# Patient Record
Sex: FEMALE | Employment: PART TIME | URBAN - NONMETROPOLITAN AREA
[De-identification: names, ages, dates, MRNs, and addresses within clinical notes are randomized per-mention and may not be internally consistent; named-entity substitution may affect disease eponyms.]

---

## 2017-07-13 ENCOUNTER — APPOINTMENT (OUTPATIENT)
Dept: OCCUPATIONAL MEDICINE | Facility: OTHER | Age: 42
End: 2017-07-13

## 2017-07-13 PROCEDURE — 99000 SPECIMEN HANDLING OFFICE-LAB: CPT

## 2020-03-30 ENCOUNTER — VIRTUAL VISIT (OUTPATIENT)
Dept: FAMILY MEDICINE | Facility: OTHER | Age: 45
End: 2020-03-30

## 2020-03-31 NOTE — PROGRESS NOTES
"Date: 2020 21:47:20  Clinician: Kiesha Montano  Clinician NPI: [id number]  Patient: Shannon Zambrano  Patient : 1975  Patient Address: [address] [place] [address]  Patient Phone: [phone]  Visit Protocol: URI  Patient Summary:  Shannon is a 45 year old ( : 1975 ) female who initiated a Visit for COVID-19 (Coronavirus) evaluation and screening. When asked the question \"Please sign me up to receive news, health information and promotions. \", Shannon responded \"Yes\".    Shannon states her symptoms started suddenly 10-13 days ago. After her symptoms started, they improved and then got worse again.   Her symptoms consist of rhinitis, a headache, myalgia, a sore throat, a cough, nasal congestion, and malaise. She is experiencing difficulty breathing due to nasal congestion but she is not short of breath. Shannon also feels feverish.   Symptom details     Nasal secretions: The color of her mucus is yellow, white, green, and clear.    Cough: Shannon coughs every 5-10 minutes and her cough is more bothersome at night. Phlegm comes into her throat when she coughs. She does not believe her cough is caused by post-nasal drip. The color of the phlegm is yellow, clear, white, and green.     Sore throat: Shannon reports having moderate throat pain (4-6 on a 10 point pain scale), does not have exudate on her tonsils, and can swallow liquids. The lymph nodes in her neck are not enlarged. A rash has not appeared on the skin since the sore throat started.     Temperature: Her current temperature is 96.5 degrees Fahrenheit.     Headache: She states the headache is moderate (4-6 on a 10 point pain scale).      Shannon denies having teeth pain, facial pain or pressure, chills, wheezing, enlarged lymph nodes, and ear pain. She also denies having a sinus infection within the past year, taking antibiotic medication for the symptoms, and having recent facial or sinus surgery in the past 60 days.   " Precipitating events  Within the past week, Shannon has been exposed to someone with strep throat. She has recently been exposed to someone with influenza. Shannon has been in close contact with the following high risk individuals: immunocompromised people.   Pertinent COVID-19 (Coronavirus) information  Shannon has not traveled internationally or to the areas where COVID-19 (Coronavirus) is widespread, including cruise ship travel in the last 14 days before the start of her symptoms.   Shannon has not had a close contact with a laboratory-confirmed COVID-19 patient within 14 days of symptom onset. She also has not had a close contact with a suspected COVID-19 patient within 14 days of symptom onset.   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 She provides direct patient care. She does not live with a healthcare worker.   Pertinent medical history  Shannon does not get yeast infections when she takes antibiotics.   Shannon needs a return to work/school note.   Weight: 175 lbs   Shannon does not smoke or use smokeless tobacco.   She denies pregnancy and denies breastfeeding. She does not menstruate.   Weight: 175 lbs    MEDICATIONS: No current medications, ALLERGIES: NKDA  Clinician Response:  Dear Shannon,  Based on the information provided, you have viral pharyngitis. This is a sore throat caused by a virus and is usually the first sign of a cold. Your sore throat should resolve in a couple days as other cold symptoms develop.  Unfortunately, there are no medications that can cure a cold, so treatment is focused on controlling symptoms as much as possible until you recover. Most people gradually feel better in 1-2 weeks.  Medication information  Because you have a viral infection, antibiotics will not help you get better. Treating a viral infection with antibiotics could actually make you feel worse.  I am prescribing:     Fluticasone 50 mcg/actuation nasal spray.  Inhale 2 sprays in each nostril 1 time per day; after 1 week, may adjust to 1 - 2 sprays in each nostril 1 time per day. This medication takes several days to start working, so keep taking it even if it doesn't help right away. There are no refills with this prescription.   Self care  Steps you can take to be as comfortable as possible:     Rest.    Drink plenty of fluids.    Take a warm shower to loosen congestion    Use a cool-mist humidifier.    Use throat lozenges.    Suck on frozen items such as popsicles.    Drink hot tea with lemon and honey.    Gargle with warm salt water (1/4 teaspoon of salt per 8 ounce glass of water).    Take a spoonful of honey to reduce your cough.     When to seek care  Please be seen in a clinic or urgent care if new symptoms develop, or symptoms become worse.  Call 911 or go to the emergency room if you feel that your throat is closing off, you suddenly develop a rash, you are unable to swallow fluids, you are drooling, or you are having difficulty breathing.  Additional treatment plan   Based on the information you have provided, you do have symptoms that are consistent with Coronavirus (COVID-19).   The coronavirus causes mild to severe respiratory illness with the most common symptoms including fever, cough and difficulty breathing. Unfortunately, many viruses cause similar symptoms and it can be difficult to distinguish between viruses, especially in mild cases, so we are presuming that anyone with cough or fever has coronavirus at this time.  Coronavirus/COVID-19 has reached the point of community spread in [place], meaning that we are finding the virus in people with no known exposure risk for randy the virus. Given the increasing commonness of coronavirus in the community we are no longer testing patients who are not critically ill.  If you are a health care worker, you should refer to your employee health office for instructions about testing and returning to work.   For everyone else who has cough or fever, you should assume you are infected with coronavirus. Since you will not be tested but have symptoms that may be consistent with coronavirus, the CDC recommends you stay in self-isolation until these three things have happened:    You have had no fever for at least 72 hours (that is three full days of no fever without the use of medicine that reduces fevers)    AND   Other symptoms have improved (for example, when your cough or shortness of breath have improved)   AND   At least 7 days have passed since your symptoms first appeared.   How to Isolate:    Isolate yourself at home.   Do Not allow any visitors  Do Not go to work or school  Do Not go to [employer],  centers, shopping, or other public places.  Do Not shake hands.  Avoid close contact with others (hugging, kissing).   Protect Others:    Cover Your Mouth and Nose with a mask, disposable tissue or wash cloth to avoid spreading germs to others.  Wash your hands and face frequently with soap and water.   Managing Symptoms:    At this time, we primarily recommend Tylenol (Acetaminophen) for fever or pain. If you have liver or kidney problems, contact your primary care provider for instructions on use of tylenol. Adults can take 650 mg (two 325 mg pills) by mouth every 4-6 hours as needed OR 1,000 mg (two 500 mg pills) every 8 hours as needed. MAXIMUM DAILY DOSE: 3,000mg. For children, refer to dosing on bottle based on age or weight.   If you develop significant shortness of breath that prevents you from doing normal activities, please call 911 or proceed to the nearest emergency room and alert them immediately that you have been in self-isolation for possible coronavirus.   If you have a higher risk medical condition such as cancer, heart failure, end stage renal disease on dialysis or have a transplant, please reach out to your specialist's clinic to advise them of your [facility] visit should you not improve within  the next two days.  For more information about COVID19 and options for caring for yourself at home, please visit the CDC website at https://www.cdc.gov/coronavirus/2019-ncov/about/steps-when-sick.htmlFor more options for care at [facility], please visit our website at [email or web address]     Diagnosis: Cough  Diagnosis ICD: R05  Prescription: fluticasone 50 mcg/actuation nasal spray,suspension 1 120 spray aerosol with adapter (grams), 30 days supply. Inhale 2 sprays in each nostril 1 time per day; after 1 week, may adjust to 1 - 2 sprays in each nostril 1 time per day.. Refills: 0, Refill as needed: no, Allow substitutions: yes

## 2020-08-04 ENCOUNTER — TELEPHONE (OUTPATIENT)
Dept: FAMILY MEDICINE | Facility: OTHER | Age: 45
End: 2020-08-04

## 2020-08-04 NOTE — TELEPHONE ENCOUNTER
Patient calling and requesting to be tested for covid. States an employee had tested positive. Patient is asymptomatic and has not had close contact, less than 6 feet for greater than 15 minutes, with the employee who tested positive. Patient does not meet criteria to be tested and was advised to call back if symptoms develop. Patient verbalized understanding.

## 2020-08-06 ENCOUNTER — TELEPHONE (OUTPATIENT)
Dept: FAMILY MEDICINE | Facility: OTHER | Age: 45
End: 2020-08-06

## 2020-08-06 ENCOUNTER — OFFICE VISIT (OUTPATIENT)
Dept: FAMILY MEDICINE | Facility: OTHER | Age: 45
End: 2020-08-06
Attending: FAMILY MEDICINE
Payer: OTHER GOVERNMENT

## 2020-08-06 DIAGNOSIS — R05.9 COUGH: Primary | ICD-10-CM

## 2020-08-06 PROCEDURE — U0003 INFECTIOUS AGENT DETECTION BY NUCLEIC ACID (DNA OR RNA); SEVERE ACUTE RESPIRATORY SYNDROME CORONAVIRUS 2 (SARS-COV-2) (CORONAVIRUS DISEASE [COVID-19]), AMPLIFIED PROBE TECHNIQUE, MAKING USE OF HIGH THROUGHPUT TECHNOLOGIES AS DESCRIBED BY CMS-2020-01-R: HCPCS | Performed by: FAMILY MEDICINE

## 2020-08-06 NOTE — LETTER
To Whom It May Concern:      The [place] Department of Health ([facility]) has requested that employers not require health care provider notes to validate illness or return to work due to current demands on medical facilities. You can provide your employer with this letter or encourage him/her to access this information on the [facility] s website at: [email or web address].     In accordance with [facility] guidelines, we are not providing individual work excuses for patients with respiratory symptoms.      The Centers for Disease Control and Prevention (CDC) has also issued guidelines for individuals that come in contact with or test positive for COVID-19. These guidelines can also be accessed at the CDC s website: https://www.cdc.gov/coronavirus/2019-ncov/if-you-are-sick/quarantine-isolation.html.    If you live in a community where COVID-19 is or might be spreading (currently, that is virtually everywhere in the [place])  1. Watch Your Health: Be alert for symptoms. Watch for fever, cough, shortness of breath, or other symptoms of COVID-19.  2. Take your temperature if symptoms develop.  3. Practice social distancing. Maintain 6 feet of distance from others, and stay out of crowded places.  4. Follow CDC guidance if symptoms develop.    If you feel healthy but recently had close contact with a person with COVID-19: Stay home and monitor your health (Quarantine)  1. The CDC recommends you monitor yourself for 14 days after your last exposure for symptoms of Covid-19.  Please follow your Employer s guidelines for return to work.  2. Check your temperature twice a day.  3. If possible, stay away from people who are at higher-risk for getting very sick from COVID-19.    If you have been diagnosed with COVID-19, are waiting test results, have cough, fever, shortness of breath, or other symptoms of COVID-19:  1. Isolate yourself from others.  2. Stay home, contact your Employer and  follow their recommended guidelines.  3. If you live with others, stay in a specific  sick room  or area and away from other people or animals, including pets. Use a separate bathroom, if available.    For general questions or for symptomatic patients that need to be screened for testing: please call   [facility] Nurse Triage at [phone].     Sincerely,   Miles Cerda

## 2020-08-06 NOTE — TELEPHONE ENCOUNTER
Pt called, requesting covid testing. Reports cough, runny nose that started yesterday. Denies chest pain, fever. Scheduled for curbside testing. Advised 14 day quarantine, symptomatic treatment, call back with change or worsening in symptoms. Pt verbalizes understanding.

## 2020-08-08 LAB
SARS-COV-2 RNA SPEC QL NAA+PROBE: NOT DETECTED
SPECIMEN SOURCE: NORMAL

## 2020-08-11 ENCOUNTER — NURSE TRIAGE (OUTPATIENT)
Dept: NURSING | Facility: OTHER | Age: 45
End: 2020-08-11

## 2020-08-11 NOTE — TELEPHONE ENCOUNTER
Pt called back, states that her employer wants to return to work even though her symptoms have not improved. Pt was advised that employer should be following guidelines from [place]/CDC but if they choose not to, that is their decision. Pt was advised to speak with her employer regarding this.

## 2020-08-11 NOTE — TELEPHONE ENCOUNTER
Pt advised to call back with any concerns or changes reports mild cough as only symptoms just requesting writer review care advice.     Reason for Disposition    COVID-19 Home Isolation, questions about    Additional Information    Negative: SEVERE difficulty breathing (e.g., struggling for each breath, speaks in single words)    Negative: Difficult to awaken or acting confused (e.g., disoriented, slurred speech)    Negative: Bluish (or gray) lips or face now    Negative: Shock suspected (e.g., cold/pale/clammy skin, too weak to stand, low BP, rapid pulse)    Negative: Sounds like a life-threatening emergency to the triager    Negative: [1] COVID-19 exposure AND [2] no symptoms    Negative: COVID-19 and Breastfeeding, questions about    Negative: [1] Adult with possible COVID-19 symptoms AND [2] triager concerned about severity of symptoms or other causes    Negative: SEVERE or constant chest pain or pressure (Exception: mild central chest pain, present only when coughing)    Negative: MODERATE difficulty breathing (e.g., speaks in phrases, SOB even at rest, pulse 100-120)    Negative: Patient sounds very sick or weak to the triager    Negative: MILD difficulty breathing (e.g., minimal/no SOB at rest, SOB with walking, pulse <100)    Negative: Chest pain or pressure    Negative: Fever > 103 F (39.4 C)    Negative: [1] Fever > 101 F (38.3 C) AND [2] age > 60    Negative: [1] Fever > 100.0 F (37.8 C) AND [2] bedridden (e.g., nursing home patient, CVA, chronic illness, recovering from surgery)    Negative: HIGH RISK patient (e.g., age > 64 years, diabetes, heart or lung disease, weak immune system)    Negative: Fever present > 3 days (72 hours)    Negative: [1] Fever returns after gone for over 24 hours AND [2] symptoms worse or not improved    Negative: [1] Continuous (nonstop) coughing interferes with work or school AND [2] no improvement using cough treatment per protocol    Negative: [1] COVID-19 infection  "suspected by caller or triager AND [2] mild symptoms (cough, fever, or others) AND [3] no complications or SOB    Negative: Cough present > 3 weeks    Negative: [1] COVID-19 diagnosed by positive lab test AND [2] mild symptoms (e.g., cough, fever, others) AND [3] no complications or SOB    Negative: [1] COVID-19 diagnosed by HCP (doctor, NP or PA) AND [2] mild symptoms (e.g., cough, fever, others) AND [3] no complications or SOB    Answer Assessment - Initial Assessment Questions  1. COVID-19 DIAGNOSIS: \"Who made your Coronavirus (COVID-19) diagnosis?\" \"Was it confirmed by a positive lab test?\" If not diagnosed by a HCP, ask \"Are there lots of cases (community spread) where you live?\" (See public health department website, if unsure)      Tested negative  2. ONSET: \"When did the COVID-19 symptoms start?\"       Last week  3. WORST SYMPTOM: \"What is your worst symptom?\" (e.g., cough, fever, shortness of breath, muscle aches)      Cough dry  4. COUGH: \"Do you have a cough?\" If so, ask: \"How bad is the cough?\"        yes  5. FEVER: \"Do you have a fever?\" If so, ask: \"What is your temperature, how was it measured, and when did it start?\"      denies  6. RESPIRATORY STATUS: \"Describe your breathing?\" (e.g., shortness of breath, wheezing, unable to speak)       denies  7. BETTER-SAME-WORSE: \"Are you getting better, staying the same or getting worse compared to yesterday?\"  If getting worse, ask, \"In what way?\"      same  8. HIGH RISK DISEASE: \"Do you have any chronic medical problems?\" (e.g., asthma, heart or lung disease, weak immune system, etc.)      denies  9. PREGNANCY: \"Is there any chance you are pregnant?\" \"When was your last menstrual period?\"      no  10. OTHER SYMPTOMS: \"Do you have any other symptoms?\"  (e.g., chills, fatigue, headache, loss of smell or taste, muscle pain, sore throat)        no    Protocols used: CORONAVIRUS (COVID-19) DIAGNOSED OR [id number] 5.16.20      "

## 2021-08-07 ENCOUNTER — HOSPITAL ENCOUNTER (EMERGENCY)
Facility: HOSPITAL | Age: 46
Discharge: HOME | End: 2021-08-07
Attending: STUDENT IN AN ORGANIZED HEALTH CARE EDUCATION/TRAINING PROGRAM | Admitting: STUDENT IN AN ORGANIZED HEALTH CARE EDUCATION/TRAINING PROGRAM
Payer: COMMERCIAL

## 2021-08-07 ENCOUNTER — APPOINTMENT (OUTPATIENT)
Dept: GENERAL RADIOLOGY | Facility: HOSPITAL | Age: 46
End: 2021-08-07
Attending: STUDENT IN AN ORGANIZED HEALTH CARE EDUCATION/TRAINING PROGRAM
Payer: COMMERCIAL

## 2021-08-07 VITALS
SYSTOLIC BLOOD PRESSURE: 138 MMHG | OXYGEN SATURATION: 96 % | HEART RATE: 65 BPM | RESPIRATION RATE: 14 BRPM | DIASTOLIC BLOOD PRESSURE: 90 MMHG | TEMPERATURE: 97.2 F

## 2021-08-07 DIAGNOSIS — M25.511 ACUTE PAIN OF RIGHT SHOULDER: ICD-10-CM

## 2021-08-07 DIAGNOSIS — M25.562 ACUTE PAIN OF LEFT KNEE: ICD-10-CM

## 2021-08-07 DIAGNOSIS — M25.572 ACUTE LEFT ANKLE PAIN: ICD-10-CM
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
## 2021-08-07 DIAGNOSIS — R20.2 TINGLING: ICD-10-CM

## 2021-08-07 PROCEDURE — 73562 X-RAY EXAM OF KNEE 3: CPT | Mod: LT

## 2021-08-07 PROCEDURE — 96372 THER/PROPH/DIAG INJ SC/IM: CPT | Performed by: STUDENT IN AN ORGANIZED HEALTH CARE EDUCATION/TRAINING PROGRAM

## 2021-08-07 PROCEDURE — 250N000011 HC RX IP 250 OP 636: Performed by: STUDENT IN AN ORGANIZED HEALTH CARE EDUCATION/TRAINING PROGRAM

## 2021-08-07 PROCEDURE — 73030 X-RAY EXAM OF SHOULDER: CPT | Mod: RT

## 2021-08-07 PROCEDURE — 99284 EMERGENCY DEPT VISIT MOD MDM: CPT | Mod: 25

## 2021-08-07 PROCEDURE — 73610 X-RAY EXAM OF ANKLE: CPT | Mod: LT

## 2021-08-07 PROCEDURE — 99284 EMERGENCY DEPT VISIT MOD MDM: CPT | Performed by: STUDENT IN AN ORGANIZED HEALTH CARE EDUCATION/TRAINING PROGRAM

## 2021-08-07 RX ORDER — KETOROLAC TROMETHAMINE 15 MG/ML
15 INJECTION, SOLUTION INTRAMUSCULAR; INTRAVENOUS ONCE
Status: COMPLETED | OUTPATIENT
Start: 2021-08-07 | End: 2021-08-07

## 2021-08-07 RX ADMIN — KETOROLAC TROMETHAMINE 15 MG: 15 INJECTION, SOLUTION INTRAMUSCULAR; INTRAVENOUS at 14:03

## 2021-08-07 ASSESSMENT — ENCOUNTER SYMPTOMS
BACK PAIN: 0
FACIAL ASYMMETRY: 0
NECK STIFFNESS: 0
PSYCHIATRIC NEGATIVE: 1
CONSTITUTIONAL NEGATIVE: 1
HEADACHES: 0
NECK PAIN: 0
CARDIOVASCULAR NEGATIVE: 1
RESPIRATORY NEGATIVE: 1
SEIZURES: 0
GASTROINTESTINAL NEGATIVE: 1
EYES NEGATIVE: 1
JOINT SWELLING: 0
ENDOCRINE NEGATIVE: 1
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
## 2021-08-07 NOTE — DISCHARGE INSTRUCTIONS
- Please take Tylenol and Ibuprofen for your symptoms  - Please return to the Emergency Room if you do not improve, feel worse, or have any new or concerning symptoms. We would especially want to see you back if you experience loss of strength/sensation in an arm or leg, worsening headache, or have a seizure.  - Please follow up with a primary care physician in 3-4 days if symptoms are not improving.
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
## 2021-08-07 NOTE — Clinical Note
Shannon Zambrano was seen and treated in our emergency department on 8/7/2021.  She may return to work on 08/10/2021.       If you have any questions or concerns, please don't hesitate to call.      Sukumar Donis MD

## 2022-02-12 ENCOUNTER — LAB (OUTPATIENT)
Dept: FAMILY MEDICINE | Facility: OTHER | Age: 47
End: 2022-02-12
Attending: FAMILY MEDICINE
Payer: OTHER GOVERNMENT

## 2022-02-12 DIAGNOSIS — Z20.822 SUSPECTED COVID-19 VIRUS INFECTION: ICD-10-CM

## 2022-02-12 PROCEDURE — U0005 INFEC AGEN DETEC AMPLI PROBE: HCPCS

## 2022-02-12 PROCEDURE — U0003 INFECTIOUS AGENT DETECTION BY NUCLEIC ACID (DNA OR RNA); SEVERE ACUTE RESPIRATORY SYNDROME CORONAVIRUS 2 (SARS-COV-2) (CORONAVIRUS DISEASE [COVID-19]), AMPLIFIED PROBE TECHNIQUE, MAKING USE OF HIGH THROUGHPUT TECHNOLOGIES AS DESCRIBED BY CMS-2020-01-R: HCPCS

## 2022-02-12 NOTE — LETTER
To whom it may concern,     Shannon Zambrano THIAGO 8- was seen today , 22 for Covid 19 testing. Results pending           Thank you for your time in this matter.   [facility].

## 2022-02-13 LAB — SARS-COV-2 RNA RESP QL NAA+PROBE: NEGATIVE

## 2022-12-11 ENCOUNTER — HOSPITAL ENCOUNTER (EMERGENCY)
Facility: HOSPITAL | Age: 47
Discharge: HOME | End: 2022-12-11
Attending: NURSE PRACTITIONER | Admitting: NURSE PRACTITIONER
Payer: MEDICAID

## 2022-12-11 VITALS
OXYGEN SATURATION: 97 % | DIASTOLIC BLOOD PRESSURE: 114 MMHG | TEMPERATURE: 99.1 F | RESPIRATION RATE: 16 BRPM | SYSTOLIC BLOOD PRESSURE: 165 MMHG | HEART RATE: 113 BPM

## 2022-12-11 DIAGNOSIS — S81.852A CAT BITE OF LEFT LOWER LEG WITH INFECTION, INITIAL ENCOUNTER: ICD-10-CM

## 2022-12-11 DIAGNOSIS — L02.416 ABSCESS OF LEFT LOWER LEG: ICD-10-CM

## 2022-12-11 DIAGNOSIS — L08.9 CAT BITE OF LEFT LOWER LEG WITH INFECTION, INITIAL ENCOUNTER: ICD-10-CM
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
## 2022-12-11 PROCEDURE — 10060 I&D ABSCESS SIMPLE/SINGLE: CPT | Performed by: NURSE PRACTITIONER

## 2022-12-11 PROCEDURE — 999N000104 HC STATISTIC NO CHARGE

## 2022-12-11 PROCEDURE — 250N000011 HC RX IP 250 OP 636: Performed by: NURSE PRACTITIONER

## 2022-12-11 PROCEDURE — 87077 CULTURE AEROBIC IDENTIFY: CPT | Performed by: NURSE PRACTITIONER

## 2022-12-11 PROCEDURE — 90471 IMMUNIZATION ADMIN: CPT | Performed by: NURSE PRACTITIONER

## 2022-12-11 PROCEDURE — 10060 I&D ABSCESS SIMPLE/SINGLE: CPT

## 2022-12-11 PROCEDURE — 90715 TDAP VACCINE 7 YRS/> IM: CPT | Performed by: NURSE PRACTITIONER

## 2022-12-11 PROCEDURE — 87205 SMEAR GRAM STAIN: CPT | Performed by: NURSE PRACTITIONER

## 2022-12-11 PROCEDURE — 96372 THER/PROPH/DIAG INJ SC/IM: CPT | Mod: 59 | Performed by: NURSE PRACTITIONER

## 2022-12-11 RX ORDER — SULFAMETHOXAZOLE/TRIMETHOPRIM 800-160 MG
1 TABLET ORAL 2 TIMES DAILY
Qty: 14 TABLET | Refills: 0 | Status: SHIPPED | OUTPATIENT
Start: 2022-12-11 | End: 2022-12-18

## 2022-12-11 RX ORDER — CEFTRIAXONE SODIUM 1 G
1 VIAL (EA) INJECTION ONCE
Status: COMPLETED | OUTPATIENT
Start: 2022-12-11 | End: 2022-12-11

## 2022-12-11 RX ADMIN — CLOSTRIDIUM TETANI TOXOID ANTIGEN (FORMALDEHYDE INACTIVATED), CORYNEBACTERIUM DIPHTHERIAE TOXOID ANTIGEN (FORMALDEHYDE INACTIVATED), BORDETELLA PERTUSSIS TOXOID ANTIGEN (GLUTARALDEHYDE INACTIVATED), BORDETELLA PERTUSSIS FILAMENTOUS HEMAGGLUTININ ANTIGEN (FORMALDEHYDE INACTIVATED), BORDETELLA PERTUSSIS PERTACTIN ANTIGEN, AND BORDETELLA PERTUSSIS FIMBRIAE 2/3 ANTIGEN 0.5 ML: 5; 2; 2.5; 5; 3; 5 INJECTION, SUSPENSION INTRAMUSCULAR at 14:23

## 2022-12-11 RX ADMIN — CEFTRIAXONE 1 G: 1 INJECTION, POWDER, FOR SOLUTION INTRAMUSCULAR; INTRAVENOUS at 14:23

## 2022-12-11 ASSESSMENT — ENCOUNTER SYMPTOMS
CHILLS: 0
NAUSEA: 0
FEVER: 0
VOMITING: 0
COLOR CHANGE: 1
LIGHT-HEADEDNESS: 0
HEADACHES: 1
NUMBNESS: 0
WOUND: 1
DIZZINESS: 0
ACTIVITY CHANGE: 1

## 2022-12-11 NOTE — DISCHARGE INSTRUCTIONS
Apply bacitracin and do twice daily dressing changes for the next 48 to 72 hours. You may shower but do not saturate the wound. If you have increased pain, redness at wound site, fevers, or abnormal drainage (purulent/pus) you need to see your primary care provider or return to Urgent Care/ER immediately. Acetaminophen/tylenol  or ibuprofen for pain. Complete all antibiotics even if feeling better.  Take antibiotics with food unless instructed otherwise. Yogurt or probiotics may help decrease stomach upset and diarrhea.  You may remove the drain in 48 hours.  If it falls off by itself it is okay.

## 2022-12-11 NOTE — ED TRIAGE NOTES
Pr presents with c/o cat bite ont he back of her left ankle/calf  Went to [facility] a month ago and is taking antibiotics, infection has been improving.  Still is painful and red  Started to drain 4 days ago.   Sore is swollen and draining

## 2022-12-11 NOTE — ED PROVIDER NOTES
History     Chief Complaint   Patient presents with     Cat Bite     HPI  Shannon Zambrano is a 47 year old female who presents with redness and swelling left posterior leg that started after her cat bit her.  Accompanied with headache.  She was treated at the [facility] clinic with Augmentin for 10 days.  Completed medication yesterday.  Non-smoker.  Had fourth COVID vaccination April, 2022.  Last Tdap 2014.  Denies fevers, chills, nausea, and vomiting.  Denies numbness and tingling in left lower leg.    Allergies:  No Known Allergies    Problem List:    There are no problems to display for this patient.       Past Medical History:    History reviewed. No pertinent past medical history.    Past Surgical History:    Past Surgical History:   Procedure Laterality Date     NO HISTORY OF SURGERY         Family History:    History reviewed. No pertinent family history.    Social History:  Marital Status:   [4]  Social History     Tobacco Use     Smoking status: Never        Medications:    sulfamethoxazole-trimethoprim (BACTRIM DS) 800-160 MG tablet  ESTRADIOL PO  Progesterone Micronized (PROGESTERONE PO)          Review of Systems   Constitutional: Positive for activity change. Negative for chills and fever.   Gastrointestinal: Negative for nausea and vomiting.   Skin: Positive for color change and wound.        Cat bite left posterior lower leg   Neurological: Positive for headaches. Negative for dizziness, light-headedness and numbness.       Physical Exam   BP: (!) 165/114  Pulse: 113  Temp: 99.1  F (37.3  C)  Resp: 16  SpO2: 97 %      Physical Exam  Vitals and nursing note reviewed.   Constitutional:       General: She is in acute distress (Moderate).      Appearance: She is normal weight.   Cardiovascular:      Rate and Rhythm: Tachycardia present.   Pulmonary:      Effort: Pulmonary effort is normal.   Musculoskeletal:         General: Swelling and tenderness present.        Legs:       Comments: Left, lower,  "posterior leg.   Skin:     General: Skin is warm and dry.      Capillary Refill: Capillary refill takes less than 2 seconds.      Findings: Erythema present. No bruising.   Neurological:      Mental Status: She is alert and oriented to person, place, and time.   Psychiatric:         Behavior: Behavior normal.         ED Course                 [facility]    PROCEDURE: -Incision/Drainage    Date/Time: 12/11/2022 6:52 PM  Performed by: Sangeeta Quintero CNP  Authorized by: Sangeeta Quintero CNP     Risks, benefits and alternatives discussed.      LOCATION:      Type:  Abscess    Size:  1 cm    Location:  Lower extremity    Lower extremity location:  Leg    Leg location:  L lower leg    PRE-PROCEDURE DETAILS:     Skin preparation:  Hibiclens    PROCEDURE TYPE:     Complexity:  Simple    ANESTHESIA (see MAR for exact dosages):     Anesthesia method:  Local infiltration    Local anesthetic:  Lidocaine 2% WITH epi (3 ml)    PROCEDURE DETAILS:     Needle aspiration: no      Incision types:  Single straight    Incision depth:  Dermal    Scalpel blade:  11    Wound management:  Probed and deloculated and irrigated with saline    Drainage:  Purulent    Drainage amount:  Moderate    Wound treatment:  Wound left open and drain placed    Packing materials:  1/4 in iodoform gauze and 1/4 in gauze    Amount 1/4\":  Four inches    PROCEDURE  Describe Procedure: Consent obtained. Time out completed.  Left lower leg region cleaned with Hibiclens and water.  1/8 inch linear incision made  with #11 blade and area probed and deloculated.  Moderate amount of purulent secretions obtained. Culture obtained. Irrigated with 20 ml NS.  4 inch of one fourth th inch packing placed in wound and dry dressing placed.       Patient Tolerance:  Patient tolerated the procedure well with no immediate complications               No results found for this or any previous visit (from the past 24 hour(s)).    Medications   cefTRIAXone " (ROCEPHIN) in lidocaine 1% (PF) injection 1 g (1 g Intramuscular Given 12/11/22 1423)   Tdap (tetanus-diphtheria-acell pertussis) (ADACEL) injection 0.5 mL (0.5 mLs Intramuscular Given 12/11/22 1423)       Assessments & Plan (with Medical Decision Making)     I have reviewed the nursing notes.    I have reviewed the findings, diagnosis, plan and need for follow up with the patient.  (L02.416) Abscess of left lower leg    (S81.852A,  L08.9,  W55.01XA) Cat bite of left lower leg with infection, initial encounter  Comment: 47 year old female who presents with redness and swelling left posterior leg that started after her cat bit her.  Accompanied with headache.  She was treated at the [facility] clinic with Augmentin for 10 days.  Completed medication yesterday.  Non-smoker.  Had fourth COVID vaccination April, 2022.  Last Tdap 2014.  Denies fevers, chills, nausea, and vomiting.  Denies numbness and tingling in left lower leg.    MDM: Left, lower, posterior leg is very erythematous with 1 cm abscess.    See procedure note for incision and drainage  Wound culture sent    Ceftriaxone 1 g given IM in urgent care    Plan: Bactrim twice daily for 7 days.  Education provided and/or discussed for this/these medication and affected wound.  Apply bacitracin and do twice daily dressing changes for the next 48 to 72 hours. You may shower but do not saturate the wound. If you have increased pain, redness at wound site, fevers, or abnormal drainage (purulent/pus) you need to see your primary care provider or return to Urgent Care/ER immediately. Acetaminophen/tylenol  or ibuprofen for pain. Complete all antibiotics even if feeling better.  Take antibiotics with food unless instructed otherwise. Yogurt or probiotics may help decrease stomach upset and diarrhea.  You may remove the drain in 48 hours.  If it falls off by itself it is okay.  These discharge instructions and medications were reviewed with her and understanding  verbalized.    This document was prepared using a combination of typing and voice generated software.  While every attempt was made for accuracy, spelling and grammatical errors may exist.    Discharge Medication List as of 12/11/2022  2:18 PM      START taking these medications    Details   sulfamethoxazole-trimethoprim (BACTRIM DS) 800-160 MG tablet Take 1 tablet by mouth 2 times daily for 7 days, Disp-14 tablet, R-0, E-Prescribe             Final diagnoses:   Abscess of left lower leg   Cat bite of left lower leg with infection, initial encounter       12/11/2022   [facility]       Sangeeta Quintero, CNP  12/11/22 7820

## 2022-12-11 NOTE — ED TRIAGE NOTES
Patient went to [facility] 4 weeks ago for a cat bite. Has been taking antibiotics. Infection has been improving. Continues to be painful and red- has been working

## 2022-12-11 NOTE — Clinical Note
Shannon Zambrano was seen and treated in our emergency department on 12/11/2022.  She may return to work on 12/15/2022.  Evaluated in Urgent Care     If you have any questions or concerns, please don't hesitate to call.      Sangeeta Quintero, CNP

## 2022-12-16 LAB
BACTERIA ABSC ANAEROBE+AEROBE CULT: ABNORMAL
GRAM STAIN RESULT: ABNORMAL

## 2023-05-22 ENCOUNTER — TELEPHONE (OUTPATIENT)
Dept: PLASTIC SURGERY | Facility: CLINIC | Age: 48
End: 2023-05-22
Payer: COMMERCIAL

## 2023-05-22 DIAGNOSIS — F64.0 GENDER DYSPHORIA IN ADULT: Primary | ICD-10-CM

## 2023-05-22 NOTE — TELEPHONE ENCOUNTER
[facility] Call Center    Phone Message    May a detailed message be left on voicemail: yes     Reason for Call: Other: Shannon is calling in looking to schedule an appointment for a Bottom Surgery consult. Pt states that her referral and Recs were sent on 5/19/22. Please call back as soon as possible to discuss.     Action Taken: Message routed to:  [facility]    Travel Screening: Not Applicable

## 2023-05-22 NOTE — CONFIDENTIAL NOTE
[facility] :  Care Coordination Note     SITUATION   Shannon Zambrano (she/her) is a 48 year old female who is receiving support for:  Appointment (Bottom Surgery) and Care Team  .    BACKGROUND     Pt is scheduled for a full depth vaginoplasty consult with Georgiana Zelaya on 8/4/23.     Writer discussed hair removal and LOS. Pt stated frustration at having to obtain LOS again, as she's been out for 15 years. Writer explained that unfortunately it is an insurance requirement to cover surgery. Pt stated understanding.     ASSESSMENT     Surgery              [facility] Assessment  [facility] ([facility] Enrollment: Enrolled  Patient has a therapist: No  Letter of support #1: Requested  Letter of support #2: Requested  Surgery being considered: Yes  Vaginoplasty: Yes    Pt reports:   No nicotine or other gender affirming surgeries  HRT for 15 years      PLAN          Nursing Interventions:       Follow-up plan:  1. Establish with a MH provider.   2. Start hair removal, if desired.        Cynthia Morel

## 2023-05-23 ENCOUNTER — TELEPHONE (OUTPATIENT)
Dept: PLASTIC SURGERY | Facility: CLINIC | Age: 48
End: 2023-05-23
Payer: COMMERCIAL

## 2023-05-23 NOTE — CONFIDENTIAL NOTE
Writer mailed a printed out copy of the [facility] welcome packet and 8/4/23 consult information, per patient request.

## 2023-05-23 NOTE — TELEPHONE ENCOUNTER
FUTURE VISIT INFORMATION      FUTURE VISIT INFORMATION:    Date: 8/4/23    Time: 12:30 PM    Location: [facility]  REFERRAL INFORMATION:    Referring provider: N/A    Referring providers clinic: N/A    Reason for visit/diagnosis: Full Depth Vaginoplasty    RECORDS REQUESTED FROM:       * No records to Collect

## 2023-08-02 ENCOUNTER — TELEPHONE (OUTPATIENT)
Dept: PLASTIC SURGERY | Facility: CLINIC | Age: 48
End: 2023-08-02
Payer: COMMERCIAL

## 2023-08-02 NOTE — TELEPHONE ENCOUNTER
[facility] Call Center    Phone Message    May a detailed message be left on voicemail: yes     Reason for Call: Other: Pt stated they are unable to make their 08/04/2023 appt and is requesting a call back to discuss rescheduling.       Action Taken: Message routed to:  [facility]    Travel Screening: Not Applicable

## 2023-08-03 NOTE — TELEPHONE ENCOUNTER
Called pt to follow up about needing to cancel tomorrow's appointment with Georgiana Zelaya. Pt does not have a ride and needs to reschedule. Will cancel the appointment tomorrow. Explained that Soumya will contact pt to reschedule within the next week or so. Pt thanked writer for calling back and will await a call to reschedule.

## 2023-08-04 ENCOUNTER — PRE VISIT (OUTPATIENT)
Dept: PLASTIC SURGERY | Facility: CLINIC | Age: 48
End: 2023-08-04

## 2023-08-16 ENCOUNTER — TELEPHONE (OUTPATIENT)
Dept: PLASTIC SURGERY | Facility: CLINIC | Age: 48
End: 2023-08-16
Payer: COMMERCIAL

## 2023-08-16 NOTE — CONFIDENTIAL NOTE
Parvez called to reschedule missed bottom surgery appointment - due to transportation issues. Writer offered virtual but pt stated it's better for her to come in person. Writer rescheduled her for next available on 11/10/23.

## 2023-08-17 NOTE — TELEPHONE ENCOUNTER
FUTURE VISIT INFORMATION      FUTURE VISIT INFORMATION:  Date: 11/10/23  Time: 10:30am  Location: [facility]  REFERRAL INFORMATION:  Referring provider: N/A  Referring providers clinic: N/A  Reason for visit/diagnosis: Full Depth Vaginoplasty     RECORDS REQUESTED FROM:         * No records to Collect

## 2023-09-30 PROCEDURE — 99284 EMERGENCY DEPT VISIT MOD MDM: CPT | Mod: 25

## 2023-09-30 PROCEDURE — 99284 EMERGENCY DEPT VISIT MOD MDM: CPT | Performed by: STUDENT IN AN ORGANIZED HEALTH CARE EDUCATION/TRAINING PROGRAM

## 2023-10-01 ENCOUNTER — HOSPITAL ENCOUNTER (EMERGENCY)
Facility: HOSPITAL | Age: 48
Discharge: HOME | End: 2023-10-01
Attending: STUDENT IN AN ORGANIZED HEALTH CARE EDUCATION/TRAINING PROGRAM | Admitting: STUDENT IN AN ORGANIZED HEALTH CARE EDUCATION/TRAINING PROGRAM
Payer: COMMERCIAL

## 2023-10-01 VITALS
DIASTOLIC BLOOD PRESSURE: 85 MMHG | HEART RATE: 92 BPM | RESPIRATION RATE: 16 BRPM | OXYGEN SATURATION: 93 % | TEMPERATURE: 98.6 F | SYSTOLIC BLOOD PRESSURE: 143 MMHG
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
## 2023-10-01 DIAGNOSIS — L08.9 FINGER INFECTION: ICD-10-CM

## 2023-10-01 LAB
ANION GAP SERPL CALCULATED.3IONS-SCNC: 13 MMOL/L (ref 7–15)
BUN SERPL-MCNC: 12.5 MG/DL (ref 6–20)
CALCIUM SERPL-MCNC: 9.4 MG/DL (ref 8.6–10)
CHLORIDE SERPL-SCNC: 104 MMOL/L (ref 98–107)
CREAT SERPL-MCNC: 0.7 MG/DL (ref 0.51–0.95)
CRP SERPL-MCNC: 5.93 MG/L
DEPRECATED HCO3 PLAS-SCNC: 21 MMOL/L (ref 22–29)
EGFRCR SERPLBLD CKD-EPI 2021: >90 ML/MIN/1.73M2
ERYTHROCYTE [DISTWIDTH] IN BLOOD BY AUTOMATED COUNT: 13.4 % (ref 10–15)
GLUCOSE SERPL-MCNC: 124 MG/DL (ref 70–99)
HCT VFR BLD AUTO: 42.7 % (ref 35–47)
HGB BLD-MCNC: 14.6 G/DL (ref 11.7–15.7)
MCH RBC QN AUTO: 30.1 PG (ref 26.5–33)
MCHC RBC AUTO-ENTMCNC: 34.2 G/DL (ref 31.5–36.5)
MCV RBC AUTO: 88 FL (ref 78–100)
PLATELET # BLD AUTO: 204 10E3/UL (ref 150–450)
POTASSIUM SERPL-SCNC: 3.5 MMOL/L (ref 3.4–5.3)
RBC # BLD AUTO: 4.85 10E6/UL (ref 3.8–5.2)
SODIUM SERPL-SCNC: 138 MMOL/L (ref 135–145)
WBC # BLD AUTO: 15.7 10E3/UL (ref 4–11)

## 2023-10-01 PROCEDURE — 85027 COMPLETE CBC AUTOMATED: CPT | Performed by: STUDENT IN AN ORGANIZED HEALTH CARE EDUCATION/TRAINING PROGRAM

## 2023-10-01 PROCEDURE — 250N000013 HC RX MED GY IP 250 OP 250 PS 637: Performed by: STUDENT IN AN ORGANIZED HEALTH CARE EDUCATION/TRAINING PROGRAM

## 2023-10-01 PROCEDURE — 96365 THER/PROPH/DIAG IV INF INIT: CPT

## 2023-10-01 PROCEDURE — 80048 BASIC METABOLIC PNL TOTAL CA: CPT | Performed by: STUDENT IN AN ORGANIZED HEALTH CARE EDUCATION/TRAINING PROGRAM

## 2023-10-01 PROCEDURE — 86140 C-REACTIVE PROTEIN: CPT | Performed by: STUDENT IN AN ORGANIZED HEALTH CARE EDUCATION/TRAINING PROGRAM

## 2023-10-01 PROCEDURE — 36415 COLL VENOUS BLD VENIPUNCTURE: CPT | Performed by: STUDENT IN AN ORGANIZED HEALTH CARE EDUCATION/TRAINING PROGRAM

## 2023-10-01 PROCEDURE — 250N000011 HC RX IP 250 OP 636: Performed by: STUDENT IN AN ORGANIZED HEALTH CARE EDUCATION/TRAINING PROGRAM

## 2023-10-01 RX ORDER — ACETAMINOPHEN 325 MG/1
975 TABLET ORAL ONCE
Status: COMPLETED | OUTPATIENT
Start: 2023-10-01 | End: 2023-10-01

## 2023-10-01 RX ORDER — AMPICILLIN AND SULBACTAM 2; 1 G/1; G/1
3 INJECTION, POWDER, FOR SOLUTION INTRAMUSCULAR; INTRAVENOUS ONCE
Status: COMPLETED | OUTPATIENT
Start: 2023-10-01 | End: 2023-10-01

## 2023-10-01 RX ADMIN — ACETAMINOPHEN 975 MG: 325 TABLET, FILM COATED ORAL at 00:43

## 2023-10-01 RX ADMIN — AMPICILLIN SODIUM AND SULBACTAM SODIUM 3 G: 2; 1 INJECTION, POWDER, FOR SOLUTION INTRAMUSCULAR; INTRAVENOUS at 00:42

## 2023-10-01 ASSESSMENT — ACTIVITIES OF DAILY LIVING (ADL)
ADLS_ACUITY_SCORE: 35
ADLS_ACUITY_SCORE: 35

## 2023-10-01 NOTE — ED PROVIDER NOTES
[facility]  ED Provider Note    Chief Complaint   Patient presents with    Cat Bite     History:  Shannon Zambrano is a 48 year old right handed female with no pertinent past medical history presenting with concerns regarding a right hand cat bite.  The patient reports that she was trying to break up her cats that were fighting earlier tonight, and got scratched, in multiple places, and bit in multiple places.  She got bit on the distal second finger, right thumb, right wrist, and left index finger.    She reports that she feels like the areas on her right wrist are scratches.  She reports that the bites have become exquisitely more painful.  She is noticing the most pain in her right second finger.  Is any fevers or chills.  This incident happened approximately 4 to 6 hours prior to arrival.    Review of Systems   Performed; see HPI for pertinent positives and negatives.     Medical history, surgical history, and social history was reviewed.  Nursing documentation, triage note, and vitals were reviewed.    Vitals:  BP: 141/94  Pulse: 102  Temp: 98.6  F (37  C)  Resp: 16  SpO2: 93 %    Physical Exam:  General: Well-appearing, nontoxic  Head: No trauma.  Neck: Spontaneous range of motion.  Cardiovascular: Tachycardic, with 2+ bilateral radial pulses.  Carlos: Unlabored respirations.  Abdomen: Soft, nontender.  Skin: There is an area of erythema streaking up the right index finger, with underlying tenderness to palpation.  There is no crepitus.  Extremities: There is tense only noted to the right second flexor surface.  There is difficulty with flexion of the right finger.  There is tenderness to palpation with passive extension.  There is circumferential swelling noted in the second right finger, mostly affecting the flexor surface, but with minimal extension to the extensor surface.  Neuro: Alert and appropriate.  Grossly nonfocal.            MDM:  In summary, this a 48-year-old female who is  presenting for concerns regarding a cat bite to her distal second index finger.  She does appear to be developing tenosynovitis in this hand.  Her vital signs are notable for tachycardia.    Discussed with the patient the concern for this infection, and plan for basic blood work, and IV antibiotics.  I do anticipate that she may require transfer for hand surgery consultation given the injury pattern, and exam findings.  We will plan to discuss the case with hand surgery and determine next level steps in care.    Discussed with the patient this plan and she was agreeable.  Acetaminophen for pain control.  Patient to remain NPO.  To be updated in the emergency department.  All question concerns otherwise addressed and answered.    ED Course as of 10/01/23 0500   Sun Oct 01, 2023   0040 Discussed with Dr. Mcnulty, plastic surgery at [facility] who recommends a single dose of IV antibiotic, oral antibiotics, and close follow up in the ED tomorrow for a wound recheck.    0057 Tetanus is up to date in 2021. Will defer any update.   0200 Revaluated the patient.   Discussed with patient test results.   Upon re-evaluation the patient did not have any palmar or progressive flexor surface tenderness in the hand or the remaining digits.   Will plan for discharge with [employer] prescription for augmentin.  Work note provided.  Advised patient the to follow up in the emergency department for a re-evaluation if the condition is unchanged.   Return precautions for worsening infection reviewed in detail and provided in AVS.  Patient understood and agreed with this plan.  All questions and concerns otherwise addressed and answered.   0310 Prescription sent to [facility]; [facility] not functioning appropriately currently.     Impression:  Final diagnoses:   Cat bite, initial encounter   Finger infection        Michael Newsome MD  10/01/23 0500

## 2023-10-01 NOTE — ED TRIAGE NOTES
States 4 hours ago she was bitten by her own cat. Has bite rashard to right thumb and index finer, and left index finger. Wash out at home and applied antibiotic ointment.      Triage Assessment       Row Name 09/30/23 3615       Triage Assessment (Adult)    Airway WDL WDL

## 2023-10-01 NOTE — DISCHARGE INSTRUCTIONS
It was a pleasure taking care of you today. We saw you for a cat bite. We recommend that you take antibiotics for your symptoms.    You may take 1 gram of acetaminophen every 6 hours. Do not exceed 4 grams in 24 hours. If you continue to have pain, you may want to take ibuprofen. You may take 600 milligrams every 6 hours. It may cause an upset stomach. Take it with food to help prevent this side effect.    Please follow up in the emergency department tomorrow for a recheck if your condition, pain and redness is unchanged. Please return to the emergency department if you develop symptoms like worsening pain or if your symptoms persist or get worse. Take care. Feel better.

## 2023-10-01 NOTE — Clinical Note
Shannon Zambrano was seen and treated in our emergency department on 9/30/2023.  She may return to work on 10/02/2023.  Mrs. Zambrano may have restricted use of her right hand that may affect her ability to work.      If you have any questions or concerns, please don't hesitate to call.      Michael Newsome MD

## 2023-11-07 ENCOUNTER — TELEPHONE (OUTPATIENT)
Dept: EMERGENCY MEDICINE | Facility: HOSPITAL | Age: 48
End: 2023-11-07

## 2023-11-07 NOTE — ED NOTES
Care Transitions focused note:      Call from [facility] [place] in [place].  They are calling as they had reached out to patient and scheduled her an appt on Oct 5th 2023 but were unable to reach her for follow up on the referral we had made from the ED.    I also tried to reaching out to patient to assist with any follow up she may need.  I left a message with my name, contact number and reason I am calling on her voicemail.    Will await call back    JESSIE Hannah

## 2023-11-10 ENCOUNTER — TELEPHONE (OUTPATIENT)
Dept: PLASTIC SURGERY | Facility: CLINIC | Age: 48
End: 2023-11-10

## 2023-11-10 ENCOUNTER — PRE VISIT (OUTPATIENT)
Dept: PLASTIC SURGERY | Facility: CLINIC | Age: 48
End: 2023-11-10

## 2023-11-13 NOTE — TELEPHONE ENCOUNTER
FUTURE VISIT INFORMATION      FUTURE VISIT INFORMATION:  Date: 1/12/24  Time: 2:30pm  [place] [facility]  FUTURE VISIT INFORMATION        FUTURE VISIT INFORMATION:  Date: 11/10/23  Time: 10:30am  Location: [facility]  REFERRAL INFORMATION:  Referring provider: N/A  Referring providers clinic: N/A  Reason for visit/diagnosis: Full Depth Vaginoplasty     RECORDS REQUESTED FROM:         * No records to Collect

## 2024-01-10 ENCOUNTER — TELEPHONE (OUTPATIENT)
Dept: PLASTIC SURGERY | Facility: CLINIC | Age: 49
End: 2024-01-10
Payer: COMMERCIAL

## 2024-01-10 NOTE — TELEPHONE ENCOUNTER
Called pt to verify that she is able to come to 1/12/24 appointment with Georgiana Zelaya NP. Unable to reach, left voicemail stating reason for calling and provided a callback number.

## 2024-01-12 ENCOUNTER — PRE VISIT (OUTPATIENT)
Dept: PLASTIC SURGERY | Facility: CLINIC | Age: 49
End: 2024-01-12

## 2024-01-12 ENCOUNTER — DOCUMENTATION ONLY (OUTPATIENT)
Dept: PLASTIC SURGERY | Facility: CLINIC | Age: 49
End: 2024-01-12

## 2024-01-12 NOTE — PROGRESS NOTES
Pt left voicemail stating he needs to cancel her appointment this afternoon with Georgiana Zelaya NP due to difficulty traveling in inclement weather. Pt would like to receive a call next Monday, January 15th after 11:00 am to reschedule in March or April. Informed Soumya of this information. Will cancel pt's appointment per her request.

## 2024-01-15 ENCOUNTER — TELEPHONE (OUTPATIENT)
Dept: PLASTIC SURGERY | Facility: CLINIC | Age: 49
End: 2024-01-15
Payer: COMMERCIAL

## 2024-01-15 NOTE — CONFIDENTIAL NOTE
Pt previously left a voicemail on Renato's phone requesting to reschedule canceled vaginoplasty consult with Georgiana Zelaya for a time in March or April. Writer called back and CLARITAM.

## 2024-03-02 ENCOUNTER — HOSPITAL ENCOUNTER (EMERGENCY)
Facility: HOSPITAL | Age: 49
Discharge: HOME | End: 2024-03-02
Payer: COMMERCIAL

## 2024-03-02 VITALS
TEMPERATURE: 98.2 F | OXYGEN SATURATION: 98 % | RESPIRATION RATE: 18 BRPM | BODY MASS INDEX: 28.73 KG/M2 | HEART RATE: 101 BPM | DIASTOLIC BLOOD PRESSURE: 82 MMHG | SYSTOLIC BLOOD PRESSURE: 139 MMHG | WEIGHT: 178 LBS

## 2024-03-02 DIAGNOSIS — S91.052A CAT BITE OF LEFT ANKLE, INITIAL ENCOUNTER: ICD-10-CM
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
## 2024-03-02 PROCEDURE — 250N000011 HC RX IP 250 OP 636

## 2024-03-02 PROCEDURE — 99213 OFFICE O/P EST LOW 20 MIN: CPT

## 2024-03-02 PROCEDURE — G0463 HOSPITAL OUTPT CLINIC VISIT: HCPCS | Mod: 25

## 2024-03-02 PROCEDURE — 96372 THER/PROPH/DIAG INJ SC/IM: CPT

## 2024-03-02 RX ORDER — CEFTRIAXONE SODIUM 1 G
1 VIAL (EA) INJECTION ONCE
Status: COMPLETED | OUTPATIENT
Start: 2024-03-02 | End: 2024-03-02

## 2024-03-02 RX ADMIN — CEFTRIAXONE 1 G: 1 INJECTION, POWDER, FOR SOLUTION INTRAMUSCULAR; INTRAVENOUS at 14:02

## 2024-03-02 ASSESSMENT — ENCOUNTER SYMPTOMS
FEVER: 0
WOUND: 1
NUMBNESS: 0
ARTHRALGIAS: 1
CHILLS: 0
ACTIVITY CHANGE: 1
COLOR CHANGE: 0

## 2024-03-02 ASSESSMENT — ACTIVITIES OF DAILY LIVING (ADL): ADLS_ACUITY_SCORE: 35

## 2024-03-02 ASSESSMENT — COLUMBIA-SUICIDE SEVERITY RATING SCALE - C-SSRS
6. HAVE YOU EVER DONE ANYTHING, STARTED TO DO ANYTHING, OR PREPARED TO DO ANYTHING TO END YOUR LIFE?: NO
1. IN THE PAST MONTH, HAVE YOU WISHED YOU WERE DEAD OR WISHED YOU COULD GO TO SLEEP AND NOT WAKE UP?: NO
2. HAVE YOU ACTUALLY HAD ANY THOUGHTS OF KILLING YOURSELF IN THE PAST MONTH?: NO

## 2024-03-02 NOTE — ED PROVIDER NOTES
History     Chief Complaint   Patient presents with    Cat Bite     HPI  Shannon Zambrano is a 49 year old female who presents to the urgent care with complaints of left posterior ankle/foot pain after she stepped on cat, causing cat to bite her. She denies numbness/tingling, fevers, chills, and recent illness. Incident occurred 2 hours prior to arrival. Cleaned at home after incident. No recent abx or OTC medications. No hx of DM.    Allergies:  No Known Allergies    Problem List:    There are no problems to display for this patient.       Past Medical History:    No past medical history on file.    Past Surgical History:    Past Surgical History:   Procedure Laterality Date    NO HISTORY OF SURGERY         Family History:    No family history on file.    Social History:  Marital Status:   [4]  Social History     Tobacco Use    Smoking status: Never        Medications:    amoxicillin-clavulanate (AUGMENTIN) 875-125 MG tablet  ESTRADIOL PO  Progesterone Micronized (PROGESTERONE PO)          Review of Systems   Constitutional:  Positive for activity change. Negative for chills and fever.   Musculoskeletal:  Positive for arthralgias. Negative for gait problem.   Skin:  Positive for wound. Negative for color change, pallor and rash.   Neurological:  Negative for numbness.   All other systems reviewed and are negative.      Physical Exam   BP: 139/82  Pulse: 101  Temp: 98.2  F (36.8  C)  Resp: 18  Weight: 80.7 kg (178 lb)  SpO2: 98 %      Physical Exam  Vitals and nursing note reviewed.   Constitutional:       General: She is not in acute distress.     Appearance: Normal appearance. She is not ill-appearing, toxic-appearing or diaphoretic.   Cardiovascular:      Pulses: Normal pulses.   Musculoskeletal:         General: Swelling, tenderness and signs of injury present. No deformity.      Right lower leg: No edema.      Left lower leg: No edema.        Feet:    Skin:     General: Skin is warm and dry.       Capillary Refill: Capillary refill takes less than 2 seconds.      Coloration: Skin is not pale.      Findings: Signs of injury and wound present. No bruising or erythema.   Neurological:      Mental Status: She is alert.         ED Course        Procedures                No results found for this or any previous visit (from the past 24 hour(s)).    Medications   cefTRIAXone (ROCEPHIN) in lidocaine 1% (PF) for IM administration 1 g (1 g Intramuscular $Given 3/2/24 1407)       Assessments & Plan (with Medical Decision Making)     I have reviewed the nursing notes.    I have reviewed the findings, diagnosis, plan and need for follow up with the patient.  Shannon Zambrano is a 49 year old female who presents to the urgent care with complaints of left posterior ankle/foot pain after she stepped on cat, causing cat to bite her. She denies numbness/tingling, fevers, chills, and recent illness. Incident occurred 2 hours prior to arrival. Cleaned at home after incident. No recent abx or OTC medications. Last Tdap 2022. No hx of DM.     MDM: vital signs normal, afebrile. Strong pulses to left lower extremity. Puncture wounds noted to posterior ankle with mild swelling. No erythema, warmth, bruising, or drainage. Foot/ankle soaked. irrigated with 1000ml of sterile saline. Abx ointment applied. Rocephin injection given. Augmentin prescribed. Supportive measures and return precautions discussed. She is in agreement with plan.     (S91.052A,  W55.01XA) Cat bite of left ankle, initial encounter  Plan: Augmentin 2 times daily for 7 days. Yogurt or probiotic while taking. Soak foot/ankle at least 3 times daily in warm epsom salt baths.Return to the ED/UC with any redness, fevers, increased pain, or other concerns.   Follow up in the clinic next week for a recheck.        New Prescriptions    AMOXICILLIN-CLAVULANATE (AUGMENTIN) 875-125 MG TABLET    Take 1 tablet by mouth 2 times daily for 7 days       Final diagnoses:   Cat bite of  left ankle, initial encounter       3/2/2024   [facility]       Leslee Downing NP  03/02/24 3866

## 2024-03-02 NOTE — DISCHARGE INSTRUCTIONS
Augmentin 2 times daily for 7 days. Yogurt or probiotic while taking.     Return to the ED/UC with any redness, fevers, increased pain, or other concerns.   Follow up in the clinic next week for a recheck.

## 2024-03-02 NOTE — ED TRIAGE NOTES
Pt presents today with c/o stepping on her own cat and he bite her.  Happened 2 hours ago. Cat does not have vaccine.